# Patient Record
Sex: FEMALE
[De-identification: names, ages, dates, MRNs, and addresses within clinical notes are randomized per-mention and may not be internally consistent; named-entity substitution may affect disease eponyms.]

---

## 2018-12-11 ENCOUNTER — HOSPITAL ENCOUNTER (EMERGENCY)
Dept: HOSPITAL 42 - ED | Age: 78
Discharge: HOME | End: 2018-12-11
Payer: MEDICARE

## 2018-12-11 VITALS — BODY MASS INDEX: 26.6 KG/M2

## 2018-12-11 VITALS — HEART RATE: 81 BPM | SYSTOLIC BLOOD PRESSURE: 150 MMHG | DIASTOLIC BLOOD PRESSURE: 76 MMHG

## 2018-12-11 VITALS — RESPIRATION RATE: 18 BRPM | OXYGEN SATURATION: 96 % | TEMPERATURE: 98.3 F

## 2018-12-11 DIAGNOSIS — N39.0: Primary | ICD-10-CM

## 2018-12-11 DIAGNOSIS — M54.5: ICD-10-CM

## 2018-12-11 LAB
APPEARANCE UR: (no result)
BACTERIA #/AREA URNS HPF: (no result) /[HPF]
BASOPHILS # BLD AUTO: 0.04 K/MM3 (ref 0–2)
BASOPHILS NFR BLD: 0.4 % (ref 0–3)
BILIRUB UR-MCNC: NEGATIVE MG/DL
BUN SERPL-MCNC: 15 MG/DL (ref 7–21)
CALCIUM SERPL-MCNC: 9.6 MG/DL (ref 8.4–10.5)
COLOR UR: YELLOW
EOSINOPHIL # BLD: 0.2 10*3/UL (ref 0–0.7)
EOSINOPHIL NFR BLD: 1.8 % (ref 1.5–5)
ERYTHROCYTE [DISTWIDTH] IN BLOOD BY AUTOMATED COUNT: 13 % (ref 11.5–14.5)
GFR NON-AFRICAN AMERICAN: > 60
GLUCOSE UR STRIP-MCNC: NEGATIVE MG/DL
GRANULOCYTES # BLD: 6.82 10*3/UL (ref 1.4–6.5)
GRANULOCYTES NFR BLD: 66.9 % (ref 50–68)
HGB BLD-MCNC: 15.3 G/DL (ref 12–16)
LEUKOCYTE ESTERASE UR-ACNC: (no result) LEU/UL
LYMPHOCYTES # BLD: 2.6 10*3/UL (ref 1.2–3.4)
LYMPHOCYTES NFR BLD AUTO: 25.7 % (ref 22–35)
MCH RBC QN AUTO: 30.5 PG (ref 25–35)
MCHC RBC AUTO-ENTMCNC: 33 G/DL (ref 31–37)
MCV RBC AUTO: 92.4 FL (ref 80–105)
MONOCYTES # BLD AUTO: 0.5 10*3/UL (ref 0.1–0.6)
MONOCYTES NFR BLD: 5.2 % (ref 1–6)
PH UR STRIP: 5.5 [PH] (ref 4.7–8)
PLATELET # BLD: 269 10^3/UL (ref 120–450)
PMV BLD AUTO: 9.3 FL (ref 7–11)
PROT UR STRIP-MCNC: NEGATIVE MG/DL
RBC # BLD AUTO: 5.01 10^6/UL (ref 3.5–6.1)
RBC # UR STRIP: NEGATIVE /UL
RBC #/AREA URNS HPF: (no result) /HPF (ref 0–2)
SP GR UR STRIP: >= 1.03 (ref 1–1.03)
UROBILINOGEN UR STRIP-ACNC: 0.2 E.U./DL
WBC # BLD AUTO: 10.2 10^3/UL (ref 4.5–11)
WBC #/AREA URNS HPF: (no result) /HPF (ref 0–6)

## 2018-12-11 NOTE — CT
Date of service: 



12/11/2018



PROCEDURE:  CT Abdomen and Pelvis without intravenous contrast



HISTORY:

right flank pain, stone



COMPARISON:

Abdomen CT without contrast 12/31/2012.



TECHNIQUE:

Helical CT of the abdomen and pelvis was performed without oral or 

intravenous contrast as per referring physician request. Coronal and 

sagittal reformats were generated. 



Contrast dose: None



Radiation dose:



Total exam DLP = 580.9 mGy-cm.



This CT exam was performed using one or more of the following dose 

reduction techniques: Automated exposure control, adjustment of the 

mA and/or kV according to patient size, and/or use of iterative 

reconstruction technique.



FINDINGS:



LOWER THORAX:

Cardiomegaly reiterated.  Large hiatal hernia increased in size.  

Limited bilateral basilar dependent atelectasis evident. 



LIVER:

Calcified granulomata again seen at the dome posteriorly with liver 

upper limits normal size.  No gross mass appreciable in this 

unenhanced exam. 



GALLBLADDER AND BILE DUCTS:

Unremarkable. 



PANCREAS:

Unremarkable. No gross lesion or ductal dilatation.



SPLEEN:

Unremarkable. 



ADRENALS:

Borderline left adrenal hyperplasia.  Unremarkable right adrenal 

gland. 



KIDNEYS AND URETERS:

No obstructive uropathy bilaterally.  No radiodense urolithiasis 

identified bilaterally either.  A tiny lucency is seen anteriorly at 

the mid pole right kidney too small to characterize.  Large minimally 

complex cyst upper midpole left kidney increased in volume 6.1 x 5.0 

cm compared to 5.2 x 4.3 cm. 



VASCULATURE:

Nonaneurysmal abdominal aortic calcific atherosclerotic changes are 

identified.



BOWEL:

Stomach is largely collapsed limiting the evaluation.  Retained food 

is seen in the lumen mildly.  The bowel is not obstructed.  Moderate 

retained fecal material scattered throughout the large bowel. 



APPENDIX:

Unremarkable. Normal appendix. 



PERITONEUM:

Unremarkable. No free fluid. No free air. 



LYMPH NODES:

Unremarkable. No enlarged lymph nodes. 



BLADDER:

Unremarkable. 



REPRODUCTIVE:

Unremarkable. 



BONES:

No acute fracture. 



OTHER FINDINGS:

None.



IMPRESSION:

1. No obstructive uropathy or radiodense urolithiasis bilaterally.  A 

large exophytic cyst is minimally complicated at the upper midpole 

left kidney, increased in volume in the interval.  A small lucency 

seen the right kidney's mid pole anteriorly, too small to 

characterize. 



2. Borderline left adrenal hyperplasia noted in the interval. 



3. No bowel obstruction, mesenteric edema, ascites or free intra 

peritoneal gas collection identified.  No pertinent gallbladder 

findings grossly.  Lack of contrast agents limits evaluation of the 

abdominal and pelvic viscera outside of the urolithiasis noncontrast 

CT indication.

## 2018-12-11 NOTE — ED PDOC
Arrival/HPI





- General


Time Seen by Provider: 12/11/18 11:52


Historian: Patient





- History of Present Illness


Narrative History of Present Illness (Text): 





12/11/18 12:18


78 year old female, with past medical history of nephrolithiasis, presents to 

the ED complaining of right flank since couple days. Patient reports symptoms 

are consistent with past episodes of nephrolithiasis experienced few years ago 

and was resolved by removal of a 5mm stone. Patient expresses concern of 

possible kidney stone, prompting her to present to her PMD who subsequently 

referred patient to the ED for medical evaluation. Patient denies any other 

associated somatic complaints including dysuria, hematuria, or changes in 

urinary output. Patient denies any fevers, chills, headache, dizziness, chest 

pain, shortness of breath, dyspnea on exertion, cough, nausea, vomiting, di

arrhea, back pain, neck pain, or any other complaints.





PMD: Dr. Darnell





Time/Duration: < week


Symptom Onset: Gradual


Symptom Course: Unchanged


Quality: Aching


Activities at Onset: Light


Context: Other (Referred by PMD)





Past Medical History





- Provider Review


Nursing Documentation Reviewed: Yes





Family/Social History





- Physician Review


Nursing Documentation Reviewed: Yes


Family/Social History: Unknown Family HX





Allergies/Home Meds


Allergies/Adverse Reactions: 


Allergies





Sulfa (Sulfonamide Antibiotics) Allergy (Verified 12/11/18 12:28)


   ANAPHYLAXIS











Review of Systems





- Physician Review


All systems were reviewed & negative as marked: Yes





- Review of Systems


Constitutional: absent: Fevers


Respiratory: absent: SOB, Cough


Cardiovascular: absent: Chest Pain


Gastrointestinal: Abdominal Pain (Right sided flank pain).  absent: Diarrhea, 

Nausea, Vomiting


Genitourinary Female: absent: Dysuria, Hematuria, Urine Output Changes


Musculoskeletal: absent: Back Pain, Neck Pain


Skin: absent: Rash


Neurological: absent: Headache, Dizziness





Physical Exam





- Physical Exam


Narrative Physical Exam (Text): 





12/11/18 12:24


Gen: VS reviewed, alert, well developed, well nourished, nontoxic, mild 

distress.


ENT: normal pharynx.


Eye: EOMI, PERRL.


Neck: no JVD, supple, no adenopathy.


CV: regular rate, regular rhythm, no rubs, no murmur, no gallops, S1, S2, pulses

equal and strong.


Pulm: no distress, clear to auscultation, no wheeze, no rhonchi, breath sounds 

equal, no rales.


Abd: soft, nontender, no guarding, no rebound, no rigidity, normal bowel sounds.

Tenderness to right SI joint.


Ext: no edema.


Skin: good color, no rash, no cyanosis.


Psych: responds appropriately to questions, normal affect.


Neuro: oriented x 3, CN2-12 intact grossly, motor intact, sensation intact.





Vital Signs Reviewed: Yes





Vital Signs











  Temp Pulse Resp BP Pulse Ox


 


 12/11/18 11:40  98.3 F  85  18  146/79  96











Temperature: Afebrile


Blood Pressure: Normal


Pulse: Regular


Respiratory Rate: Normal


Appearance: Positive for: Well-Appearing, Non-Toxic, Comfortable


Pain Distress: None


Mental Status: Positive for: Alert and Oriented X 3





Medical Decision Making


ED Course and Treatment: 





12/11/18 12:25


Impression: 78 year old female presents to the Emergency department complaining 

of right flank pain. 





Differential Diagnosis included but are not limited to:  Nephrolithiasis 





Plan:


-- CT 


-- Labs


-- Urinalysis 


-- Reassess and disposition





Prior Visits:


Notes and results from previous visits were reviewed. 





Progress Notes:





12/11/18 13:57


patient was seen for right lower back pain, reproducible on exam to palpation, 

CT was done to rule out renal stone, pain likely MSK in origin. no significant 

clinical concern for serious neurologic event such as spinal cord compression or

spinal epidural abscess. patient was incidentally found to have a uti, there is 

no fever or cvat, will tx for simple cystitis.





- RAD Interpretation


Narrative RAD Interpretations (Text): 





12/11/18 13:56


CT of Abdomen/Pelvis reviewed by radiologist, shows: 


IMPRESSION:


1. No obstructive uropathy or radiodense urolithiasis bilaterally.  A large 

exophytic cyst is minimally complicated at the upper midpole left kidney, 

increased in volume in the interval.  A small lucency seen the right kidney's 

mid pole anteriorly, too small to characterize. 


2. Borderline left adrenal hyperplasia noted in the interval. 


3. No bowel obstruction, mesenteric edema, ascites or free intra peritoneal gas 

collection identified.  No pertinent gallbladder findings grossly.  Lack of 

contrast agents limits evaluation of the abdominal and pelvic viscera outside of

the urolithiasis noncontrast CT indication.


: Radiologist





- Scribe Statement


The provider has reviewed the documentation as recorded by the Scribe


Tasfia Kesha.





All medical record entries made by the Mimi were at my direction and 

personally dictated by me. I have reviewed the chart and agree that the record 

accurately reflects my personal performance of the history, physical exam, 

medical decision making, and the department course for this patient. I have also

personally directed, reviewed, and agree with the discharge instructions and 

disposition.








Disposition/Present on Arrival





- Present on Arrival


Any Indicators Present on Arrival: No





- Disposition


Have Diagnosis and Disposition been Completed?: Yes


Diagnosis: 


 Low back pain, UTI (urinary tract infection)





Disposition: HOME/ ROUTINE


Disposition Time: 14:01


Patient Plan: Discharge


Condition: STABLE


Discharge Instructions (ExitCare):  Urinary Tract Infection, Adult (DC), Low 

Back Pain  (DC)


Additional Instructions: 


Follow up with your primary care doctor.





HILTON TOLED[facility], thank you for letting us take care of you today. Your provider 

was Dr. Martínez Natarajan and you were treated for urinary tract infection. The 

emergency medical care you received today was directed at your acute symptoms. 

If you were prescribed any medication, please fill it and take as directed. It 

may take several days for your symptoms to resolve. Return to the Emergency 

Department if your symptoms worsen, do not improve, or if you have any other 

problems.





Please contact your doctor or call one of the physicians/clinics you have been 

referred to that are listed on the Patient Visit Information form that is 

included in your discharge packet. Bring any paperwork you were given at 

discharge with you along with any medications you are taking to your follow up 

visit. Our treatment cannot replace ongoing medical care by a primary care 

provider outside of the emergency department.





Thank you for allowing the [facility] team to be part of your care today.








If you had an X-Ray or CT scan: A Radiologist will review the ED reading if any 

change in treatment is needed we will contact you.***





If you had a blood, urine, or wound culture: It will take several days for the 

results, if any change in treatment is needed we will contact you.***





If you had an STI test: It will take 48 hours for the results. Please call after

1 week if you have not heard back.***


Prescriptions: 


Nitrofurantoin Macrocrystals [Macrobid] 100 mg PO BID 7 Days #14 cap


Referrals: 


Darnell,José Manuel S, MD [Primary Care Provider] - Follow up with primary